# Patient Record
Sex: MALE | Race: BLACK OR AFRICAN AMERICAN | NOT HISPANIC OR LATINO | URBAN - METROPOLITAN AREA
[De-identification: names, ages, dates, MRNs, and addresses within clinical notes are randomized per-mention and may not be internally consistent; named-entity substitution may affect disease eponyms.]

---

## 2018-04-06 ENCOUNTER — HOSPITAL ENCOUNTER (EMERGENCY)
Facility: CLINIC | Age: 63
Discharge: NURSING, REHAB OR LONG-TERM CARE | End: 2018-04-07
Attending: EMERGENCY MEDICINE | Admitting: EMERGENCY MEDICINE
Payer: MEDICARE

## 2018-04-06 DIAGNOSIS — F10.920 ALCOHOLIC INTOXICATION WITHOUT COMPLICATION (H): ICD-10-CM

## 2018-04-06 PROCEDURE — 99285 EMERGENCY DEPT VISIT HI MDM: CPT

## 2018-04-06 NOTE — ED AVS SNAPSHOT
Emergency Department    [address] [place] [id number]    Phone:  [phone]    Fax:  [phone]                                       Andrea Ordonez   MRN: [id number]    Department:   Emergency Department   Date of Visit:  4/6/2018           After Visit Summary Signature Page     I have received my discharge instructions, and my questions have been answered. I have discussed any challenges I see with this plan with the nurse or doctor.    ..........................................................................................................................................  Patient/Patient Representative Signature      ..........................................................................................................................................  Patient Representative Print Name and Relationship to Patient    ..................................................               ................................................  Date                                            Time    ..........................................................................................................................................  Reviewed by Signature/Title    ...................................................              ..............................................  Date                                                            Time

## 2018-04-06 NOTE — ED NOTES
Bed: ED17  Expected date:   Expected time:   Means of arrival:   Comments:  [facility] - 444 - 63 M etoh eta [id number]

## 2018-04-06 NOTE — ED AVS SNAPSHOT
Emergency Department    [address] [facility] [address]    Phone:  [phone]    Fax:  [phone]                                       Andrea Ordonez   MRN: [id number]    Department:   Emergency Department   Date of Visit:  4/6/2018           Patient Information     Date Of Birth          1955        Your diagnoses for this visit were:     Alcoholic intoxication without complication (H)        You were seen by Francois Freire DO, Karo Dougherty MD, and Erlinda Reyes MD.      Follow-up Information     Follow up with Krishan Willoughby DO In 3 days.    Specialty:  Urgent Care    Contact information:    [address] [facility] [address]  [phone]          Follow up with  Emergency Department.    Specialty:  EMERGENCY MEDICINE    Why:  If symptoms worsen    Contact information:    [address] [facility] [id number] [phone]        Discharge Instructions         Alcohol Intoxication  Alcohol intoxication occurs when you drink alcohol faster than your liver can remove it from your system. The following facts are important to remember:    It can take 10 minutes or more to start to feel the effects of a drink, so you can easily get more intoxicated than you intended.    One drink may be more than 1 serving of alcohol. Depending on the drink, it can be 2 to 4 servings.    It takes about an hour for your body to metabolize (clear) 1 serving. If you have more than 1 drink, it can take a couple of hours or more.    Many things affect how drinks will affect you, including whether you ve eaten, how fast you drink, your size, how much you normally drink (or not), medicines you take, chronic diseases you have, and gender.  Signs and symptoms of alcohol poisoning  The following are signs and symptoms of alcohol poisoning:  Mild impairment    Reduced inhibitions    Slurred speech    Drowsiness    Decreased fine motor skills  Moderate  "impairment    Erratic behavior, aggression, depression    Impaired judgment    Confusion    Concentration difficulties    Coordination problems  Severe impairment    Vomiting    Seizures    Unconsciousness    Cold, clammy    Slow or irregular breathing    Hypothermia (low body temperature)    Coma  Health effects  Alcohol abuse causes health problems. Sometimes this can happen after only drinking a  little.\" There is no set number of drinks or amount of alcohol that defines too much. The more you drink at one time, and the more frequently you drink determine both the short-term and long-term health effects. It affects all parts of your body and your health, including your:    Brain. Alcohol is a central nervous system depressant. It can damage parts of the brain that affect your balance, memory, thinking, and emotions. It can cause memory loss, blackouts, depression, agitation, sleep cycle changes, and seizures. These changes may or may not be reversible.    Heart and vascular system. Alcohol affects multiple areas. It can damage heart muscle causing cardiomyopathy, which is a weakening and stretching of the heart muscle. This can lead to trouble breathing, an irregular heartbeat, atrial fibrillation, leg swelling, and heart failure. It makes the blood vessels stiffen causing hypertension (high blood pressure). All of these problems increase your risk of having heart attacks or strokes.    Liver. Alcohol causes fat to build up in the liver, affecting its normal function. This increases the risk for hepatitis, leading to abdominal pain, appetite loss, jaundice, bleeding problems, liver fibrosis, and cirrhosis. This in turn can affect your ability to fight off infections, and can cause diabetes. The liver changes prevent it from removing toxins in your blood that can cause encephalopathy. Signs of this are confusion, altered level of consciousness, personality changes, memory loss, seizures, coma, and " death.    Pancreas. Alcohol can cause inflammation of the pancreas, or pancreatitis. This can cause pain in your abdomen, fever, and diabetes.    Immune system. Alcohol weakens your immune system in a number of ways. It suppresses your immune system making it harder to fight off infections and colds. You will also have a higher risk of certain infections like pneumonia and tuberculosis.    Cancer risk. Alcohol raises your risk of cancer of the mouth, esophagus, pharynx, larynx, liver, and breast.    Sexual function. Alcohol abuse can also lead to sexual problems.  Alcohol use during pregnancy may cause permanent damage to the growing baby.  Home care  The following guidelines will help you care for yourself at home:    Don't drink any more alcohol.    Don't drive until all effects of the alcohol have worn off.    Don't operate machinery that can cause injuries.    Get lots of rest over the next few days. Drink plenty of water and other non-alcoholic liquids. Try to eat regular meals.    If you have been drinking heavily on a daily basis, you may go through alcohol withdrawal. The usual symptoms last 3 to 4 days and may include nervousness, shakiness, nausea, sweating, sleeplessness, and can even cause seizures and a serious withdrawal symptom called delirium tremens, or DTs. During this time, it is best that you stay with family or friends who can help and support you. You can also admit yourself to a residential detox program. If your symptoms are severe (seizures, severe shakiness, confusion), contact your doctor or call an ambulance for help (see below).   Follow-up care  If alcohol is a problem in your life, these are some organizations that can help you:    Alcoholics Anonymous offers support through a self-help fellowship. There are no dues or fees. See the Yellow Pages and call for time and place of meetings. Find AA online at www.aa.org.    [employer] offers support to families of alcohol users. Contact  242.247.3116, or online at www.al-anon.org.    National [place] on Alcoholism and Drug Dependence can be reached at 858-234-2979, or online at www.ncadd.org.    There are also inpatient and residential alcohol detox programs. Check the Internet or phonebook Yellow Pages under  Drug Abuse and Treatment Centers.   Call 911  Call 911 if any of these occur:    Trouble breathing or slow irregular breathing    Chest pain    Sudden weakness on one side of your body or sudden trouble speaking    Heavy bleeding or vomiting blood    Very drowsy or trouble awakening    Fainting or loss of consciousness    Rapid heart rate    Seizure  When to seek medical advice  Call your healthcare provider right away if any of these occur:    Severe shakiness     Fever over 100.4  F (38.0  C)    Confusion or hallucinations (seeing, hearing, or feeling things that are not there)    Pain in your upper abdomen that gets worse    Repeated vomiting  Date Last Reviewed: 6/1/2016 2000-2017 [employer]. [address] [place] [address]. All rights reserved. This information is not intended as a substitute for professional medical care. Always follow your healthcare professional's instructions.          24 Hour Appointment Hotline       To make an appointment at any [facility], call [phone] ([phone]). If you don't have a family doctor or clinic, we will help you find one. [facility] clinics are conveniently located to serve the needs of you and your family.             Review of your medicines      Our records show that you are taking the medicines listed below. If these are incorrect, please call your family doctor or clinic.        Dose / Directions Last dose taken    amitriptyline 25 MG tablet   Commonly known as:  ELAVIL   Dose:  25 mg        Take 25 mg by mouth   Refills:  0        amLODIPine 5 MG tablet   Commonly known as:  NORVASC   Dose:  5 mg   Quantity:  30 tablet        Take 1 tablet (5 mg) by  mouth daily   Refills:  1        carbamide peroxide 6.5 % otic solution   Commonly known as:  DEBROX   Dose:  5 drop   Quantity:  30 mL        Place 5 drops into both ears 3 times daily   Refills:  0        methylPREDNISolone 4 MG tablet   Commonly known as:  MEDROL DOSEPAK   Quantity:  21 tablet        Follow package instructions   Refills:  0                Orders Needing Specimen Collection     None      Pending Results     No orders found for last 3 day(s).            Pending Culture Results     No orders found for last 3 day(s).            Pending Results Instructions     If you had any lab results that were not finalized at the time of your Discharge, you can call the ED Lab Result RN at [phone]. You will be contacted by this team for any positive Lab results or changes in treatment. The nurses are available 7 days a week from 10A to 6:30P.  You can leave a message 24 hours per day and they will return your call.        Test Results From Your Hospital Stay               Clinical Quality Measure: Blood Pressure Screening     Your blood pressure was checked while you were in the emergency department today. The last reading we obtained was  BP: (!) 148/96 . Please read the guidelines below about what these numbers mean and what you should do about them.  If your systolic blood pressure (the top number) is less than 120 and your diastolic blood pressure (the bottom number) is less than 80, then your blood pressure is normal. There is nothing more that you need to do about it.  If your systolic blood pressure (the top number) is 120-139 or your diastolic blood pressure (the bottom number) is 80-89, your blood pressure may be higher than it should be. You should have your blood pressure rechecked within a year by a primary care provider.  If your systolic blood pressure (the top number) is 140 or greater or your diastolic blood pressure (the bottom number) is 90 or greater, you may have high blood pressure.  "High blood pressure is treatable, but if left untreated over time it can put you at risk for heart attack, stroke, or kidney failure. You should have your blood pressure rechecked by a primary care provider within the next 4 weeks.  If your provider in the emergency department today gave you specific instructions to follow-up with your doctor or provider even sooner than that, you should follow that instruction and not wait for up to 4 weeks for your follow-up visit.        Thank you for choosing [facility]       Thank you for choosing [facility] for your care. Our goal is always to provide you with excellent care. Hearing back from our patients is one way we can continue to improve our services. Please take a few minutes to complete the written survey that you may receive in the mail after you visit with us. Thank you!        [employer] Information     [employer] lets you send messages to your doctor, view your test results, renew your prescriptions, schedule appointments and more. To sign up, go to [email or web address] . Click on \"Log in\" on the left side of the screen, which will take you to the Welcome page. Then click on \"Sign up Now\" on the right side of the page.     You will be asked to enter the access code listed below, as well as some personal information. Please follow the directions to create your username and password.     Your access code is: [email or web address]  Expires: 2018  1:08 AM     Your access code will  in 90 days. If you need help or a new code, please call your [facility] clinic or [phone].        Care EveryWhere ID     This is your Care EveryWhere ID. This could be used by other organizations to access your [facility] medical records  [id number]        Equal Access to Services     MICHELE EM : Winnie Wynn, gin garcia, amanda martinez. So [facility] [phone].    ATENCIÓN: Si habla español, tiene a sr disposición " servicios gratuitos de asistencia lingüística. Gabo flores [phone].    We comply with applicable federal civil rights laws and [place] laws. We do not discriminate on the basis of race, color, national origin, age, disability, sex, sexual orientation, or gender identity.            After Visit Summary       This is your record. Keep this with you and show to your community pharmacist(s) and doctor(s) at your next visit.

## 2018-04-06 NOTE — DISCHARGE INSTRUCTIONS
"  Alcohol Intoxication  Alcohol intoxication occurs when you drink alcohol faster than your liver can remove it from your system. The following facts are important to remember:    It can take 10 minutes or more to start to feel the effects of a drink, so you can easily get more intoxicated than you intended.    One drink may be more than 1 serving of alcohol. Depending on the drink, it can be 2 to 4 servings.    It takes about an hour for your body to metabolize (clear) 1 serving. If you have more than 1 drink, it can take a couple of hours or more.    Many things affect how drinks will affect you, including whether you ve eaten, how fast you drink, your size, how much you normally drink (or not), medicines you take, chronic diseases you have, and gender.  Signs and symptoms of alcohol poisoning  The following are signs and symptoms of alcohol poisoning:  Mild impairment    Reduced inhibitions    Slurred speech    Drowsiness    Decreased fine motor skills  Moderate impairment    Erratic behavior, aggression, depression    Impaired judgment    Confusion    Concentration difficulties    Coordination problems  Severe impairment    Vomiting    Seizures    Unconsciousness    Cold, clammy    Slow or irregular breathing    Hypothermia (low body temperature)    Coma  Health effects  Alcohol abuse causes health problems. Sometimes this can happen after only drinking a  little.\" There is no set number of drinks or amount of alcohol that defines too much. The more you drink at one time, and the more frequently you drink determine both the short-term and long-term health effects. It affects all parts of your body and your health, including your:    Brain. Alcohol is a central nervous system depressant. It can damage parts of the brain that affect your balance, memory, thinking, and emotions. It can cause memory loss, blackouts, depression, agitation, sleep cycle changes, and seizures. These changes may or may not be " reversible.    Heart and vascular system. Alcohol affects multiple areas. It can damage heart muscle causing cardiomyopathy, which is a weakening and stretching of the heart muscle. This can lead to trouble breathing, an irregular heartbeat, atrial fibrillation, leg swelling, and heart failure. It makes the blood vessels stiffen causing hypertension (high blood pressure). All of these problems increase your risk of having heart attacks or strokes.    Liver. Alcohol causes fat to build up in the liver, affecting its normal function. This increases the risk for hepatitis, leading to abdominal pain, appetite loss, jaundice, bleeding problems, liver fibrosis, and cirrhosis. This in turn can affect your ability to fight off infections, and can cause diabetes. The liver changes prevent it from removing toxins in your blood that can cause encephalopathy. Signs of this are confusion, altered level of consciousness, personality changes, memory loss, seizures, coma, and death.    Pancreas. Alcohol can cause inflammation of the pancreas, or pancreatitis. This can cause pain in your abdomen, fever, and diabetes.    Immune system. Alcohol weakens your immune system in a number of ways. It suppresses your immune system making it harder to fight off infections and colds. You will also have a higher risk of certain infections like pneumonia and tuberculosis.    Cancer risk. Alcohol raises your risk of cancer of the mouth, esophagus, pharynx, larynx, liver, and breast.    Sexual function. Alcohol abuse can also lead to sexual problems.  Alcohol use during pregnancy may cause permanent damage to the growing baby.  Home care  The following guidelines will help you care for yourself at home:    Don't drink any more alcohol.    Don't drive until all effects of the alcohol have worn off.    Don't operate machinery that can cause injuries.    Get lots of rest over the next few days. Drink plenty of water and other non-alcoholic liquids.  Try to eat regular meals.    If you have been drinking heavily on a daily basis, you may go through alcohol withdrawal. The usual symptoms last 3 to 4 days and may include nervousness, shakiness, nausea, sweating, sleeplessness, and can even cause seizures and a serious withdrawal symptom called delirium tremens, or DTs. During this time, it is best that you stay with family or friends who can help and support you. You can also admit yourself to a residential detox program. If your symptoms are severe (seizures, severe shakiness, confusion), contact your doctor or call an ambulance for help (see below).   Follow-up care  If alcohol is a problem in your life, these are some organizations that can help you:    Alcoholics Anonymous offers support through a self-help fellowship. There are no dues or fees. See the Yellow Pages and call for time and place of meetings. Find AA online at www.aa.org.    [employer] offers support to families of alcohol users. Contact 849-806-2145, or online at www.al-anoli.org.    National [place] [employer] on Alcoholism and Drug Dependence can be reached at 929-439-0222, or online at www.ncadd.org.    There are also inpatient and residential alcohol detox programs. Check the Internet or phonebook Yellow Pages under  Drug Abuse and Treatment Centers.   Call 911  Call 911 if any of these occur:    Trouble breathing or slow irregular breathing    Chest pain    Sudden weakness on one side of your body or sudden trouble speaking    Heavy bleeding or vomiting blood    Very drowsy or trouble awakening    Fainting or loss of consciousness    Rapid heart rate    Seizure  When to seek medical advice  Call your healthcare provider right away if any of these occur:    Severe shakiness     Fever over 100.4  F (38.0  C)    Confusion or hallucinations (seeing, hearing, or feeling things that are not there)    Pain in your upper abdomen that gets worse    Repeated vomiting  Date Last Reviewed: 6/1/2016 2000-2017 [employer]. [address] [place] [address], [place] [address]. All rights reserved. This information is not intended as a substitute for professional medical care. Always follow your healthcare professional's instructions.

## 2018-04-06 NOTE — ED PROVIDER NOTES
"  History     Chief Complaint:  Intoxication    HPI   Andrea Ordonez is a 62 year old male with a history of HTN who presents to the emergency department for evaluation of alcohol intoxication. Earlier this afternoon, the patient was found passed on the bus and the  was unable to wake the patient. EMS was called and the patient blew a 0.311 on breathalyzer. Patient had a blood sugar of 131. This prompted the patient to be brought here to the emergency department for evaluation.    Here, the patient presents sleeping in bed and is able to be roused by a sternal rub.  After awake, he denies any pain and is requesting \"to stop being dehydrated\" and asks for a drink of water. He states he was drinking vodka earlier today.  He denies any other ingestion.  He has no chest pain, abdominal pain, headache or any other concerns.    Allergies:  NKDA     Medications:    Amlodipine  Methylprednisolone  Carbamide peroxide     Past Medical History:    HTN    Past Surgical History:    Wrist surgery    Family History:    Colorectal cancer    Social History:  Former smoker  Positive for alcohol use: 12.0-18.0 oz/week  Marital Status:  Single      Review of Systems   Psychiatric/Behavioral:        Positive for intoxication   All other systems reviewed and are negative.    Physical Exam   First Vitals:  BP: (!) 148/101  Pulse: 80  Heart Rate: 74  Temp: 97.6  F (36.4  C)  Resp: 16  SpO2: 95 %      Physical Exam  Physical Exam   General:  Sleeping in bed, but then awakes after a sternal rub.  HENT:  No obvious trauma to head  Right Ear:  External ear normal.   Left Ear:  External ear normal.   Nose:  Nose normal.   Eyes:  Conjunctivae and EOM are normal. Pupils are equal, round, and reactive.   Neck: Normal range of motion. Neck supple. No tracheal deviation present. No midline cervical neck tenderness, deformity, step off or pain in the midline with ROM.  CV:  Normal heart sounds. No murmur heard.  Pulm/Chest: Effort normal and " breath sounds normal.   Abd: Soft. No distension. There is no tenderness. There is no rigidity, no rebound and no guarding.   M/S: Normal range of motion.   No pain or deformity to palpation of all 4 extremities.  Neuro: Alert. GCS 15.  Skin: Skin is warm and dry. No rash noted. Not diaphoretic.   Psych: Normal mood and affect. Behavior is normal.   Slurring words and intoxicated appearing.    Emergency Department Course   Emergency Department Course:  1640 Nursing notes and vitals reviewed.  I performed an exam of the patient as documented above.     The patient was signed out to my colleague, Dr. Dougherty, for further monitoring and evaluation.     Impression & Plan    Medical Decision Making:  Andrea Ordonez is a very pleasant, intoxicated 62 year old male who presents for evaluation of alcohol intoxication.  They are intoxicated here in ED by EMS breathalyzer   Patient has no history of Delirium tremens or alcohol withdrawal seizures.  The patient denies any other ingestions such as acetaminophen, other drugs, toxic alcohols etc.  There are no signs of trauma related to alcohol use and no further workup is needed including head CT. Patient will be signed out to Dr. Dougherty for further monitoring and evaluation.     Diagnosis:    ICD-10-CM    1. Alcoholic intoxication without complication (H) F10.920        Disposition:  Patient signed out to Dr. Dougherty    Scribe Disclosure:  I, Carola Traylor, am serving as a scribe on 4/6/2018 at 4:04 PM to personally document services performed by Francois Freire DO based on my observations and the provider's statements to me.       Carola Traylor  4/6/2018    EMERGENCY DEPARTMENT       Francois Freire DO  04/06/18 1808

## 2018-04-07 VITALS
SYSTOLIC BLOOD PRESSURE: 148 MMHG | RESPIRATION RATE: 22 BRPM | DIASTOLIC BLOOD PRESSURE: 96 MMHG | OXYGEN SATURATION: 97 % | TEMPERATURE: 97.6 F | HEART RATE: 80 BPM

## 2018-04-07 RX ORDER — AMLODIPINE BESYLATE 10 MG/1
10 TABLET ORAL DAILY
Status: DISCONTINUED | OUTPATIENT
Start: 2018-04-07 | End: 2018-04-07 | Stop reason: HOSPADM

## 2018-04-07 NOTE — ED NOTES
3 day Norvasc and Amitriptyline given to EMS for detox. Discharge paperwork, hold form, belongings, facesheet with H and P given to [facility].

## 2018-04-07 NOTE — ED PROVIDER NOTES
The patient was signed out to me by Dr. Francois Freire to discharge to Detox or a sober ride home when appropriate.  He became ambulatory an responding appropriately and the plan is that he will be discharged to [facility] with 3 days of his medications by Ambulance.  The patient states that his Amlodipine was increased to 10 mg daily, so he was written for this and his Amitriptylline.  He was signed out to Dr. Reyes.     Karo Dougherty MD  04/07/18 0139

## 2019-08-15 ENCOUNTER — RECORDS - HEALTHEAST (OUTPATIENT)
Dept: LAB | Facility: CLINIC | Age: 64
End: 2019-08-15

## 2019-08-15 LAB
ALBUMIN SERPL-MCNC: 3.7 G/DL (ref 3.5–5)
ALP SERPL-CCNC: 115 U/L (ref 45–120)
ALT SERPL W P-5'-P-CCNC: 28 U/L (ref 0–45)
ANION GAP SERPL CALCULATED.3IONS-SCNC: 7 MMOL/L (ref 5–18)
AST SERPL W P-5'-P-CCNC: 34 U/L (ref 0–40)
BASOPHILS # BLD AUTO: 0.1 THOU/UL (ref 0–0.2)
BASOPHILS NFR BLD AUTO: 1 % (ref 0–2)
BILIRUB SERPL-MCNC: 0.3 MG/DL (ref 0–1)
BUN SERPL-MCNC: 9 MG/DL (ref 8–22)
CALCIUM SERPL-MCNC: 10 MG/DL (ref 8.5–10.5)
CHLORIDE BLD-SCNC: 105 MMOL/L (ref 98–107)
CO2 SERPL-SCNC: 27 MMOL/L (ref 22–31)
CREAT SERPL-MCNC: 0.99 MG/DL (ref 0.7–1.3)
EOSINOPHIL # BLD AUTO: 0.3 THOU/UL (ref 0–0.4)
EOSINOPHIL NFR BLD AUTO: 5 % (ref 0–6)
ERYTHROCYTE [DISTWIDTH] IN BLOOD BY AUTOMATED COUNT: 13.3 % (ref 11–14.5)
GFR SERPL CREATININE-BSD FRML MDRD: >60 ML/MIN/1.73M2
GLUCOSE BLD-MCNC: 87 MG/DL (ref 70–125)
HCT VFR BLD AUTO: 35.6 % (ref 40–54)
HGB BLD-MCNC: 11.2 G/DL (ref 14–18)
LYMPHOCYTES # BLD AUTO: 1.9 THOU/UL (ref 0.8–4.4)
LYMPHOCYTES NFR BLD AUTO: 38 % (ref 20–40)
MCH RBC QN AUTO: 27.1 PG (ref 27–34)
MCHC RBC AUTO-ENTMCNC: 31.5 G/DL (ref 32–36)
MCV RBC AUTO: 86 FL (ref 80–100)
MONOCYTES # BLD AUTO: 0.4 THOU/UL (ref 0–0.9)
MONOCYTES NFR BLD AUTO: 7 % (ref 2–10)
NEUTROPHILS # BLD AUTO: 2.5 THOU/UL (ref 2–7.7)
NEUTROPHILS NFR BLD AUTO: 49 % (ref 50–70)
PLATELET # BLD AUTO: 251 THOU/UL (ref 140–440)
PMV BLD AUTO: 12.1 FL (ref 8.5–12.5)
POTASSIUM BLD-SCNC: 3.7 MMOL/L (ref 3.5–5)
PROT SERPL-MCNC: 7 G/DL (ref 6–8)
RBC # BLD AUTO: 4.13 MILL/UL (ref 4.4–6.2)
SODIUM SERPL-SCNC: 139 MMOL/L (ref 136–145)
WBC: 5.1 THOU/UL (ref 4–11)

## 2021-05-30 ENCOUNTER — RECORDS - HEALTHEAST (OUTPATIENT)
Dept: ADMINISTRATIVE | Facility: CLINIC | Age: 66
End: 2021-05-30

## 2021-05-31 ENCOUNTER — RECORDS - HEALTHEAST (OUTPATIENT)
Dept: ADMINISTRATIVE | Facility: CLINIC | Age: 66
End: 2021-05-31

## 2021-06-01 ENCOUNTER — RECORDS - HEALTHEAST (OUTPATIENT)
Dept: ADMINISTRATIVE | Facility: CLINIC | Age: 66
End: 2021-06-01

## 2021-06-02 ENCOUNTER — RECORDS - HEALTHEAST (OUTPATIENT)
Dept: ADMINISTRATIVE | Facility: CLINIC | Age: 66
End: 2021-06-02

## 2021-06-16 PROBLEM — E86.0 DEHYDRATION: Status: ACTIVE | Noted: 2018-03-19
